# Patient Record
Sex: FEMALE | Race: WHITE | NOT HISPANIC OR LATINO | URBAN - METROPOLITAN AREA
[De-identification: names, ages, dates, MRNs, and addresses within clinical notes are randomized per-mention and may not be internally consistent; named-entity substitution may affect disease eponyms.]

---

## 2017-10-20 ENCOUNTER — APPOINTMENT (RX ONLY)
Dept: URBAN - METROPOLITAN AREA CLINIC 161 | Facility: CLINIC | Age: 72
Setting detail: DERMATOLOGY
End: 2017-10-20

## 2017-10-20 DIAGNOSIS — L21.8 OTHER SEBORRHEIC DERMATITIS: ICD-10-CM

## 2017-10-20 DIAGNOSIS — L20.89 OTHER ATOPIC DERMATITIS: ICD-10-CM

## 2017-10-20 DIAGNOSIS — L82.1 OTHER SEBORRHEIC KERATOSIS: ICD-10-CM

## 2017-10-20 DIAGNOSIS — L28.1 PRURIGO NODULARIS: ICD-10-CM

## 2017-10-20 DIAGNOSIS — D18.0 HEMANGIOMA: ICD-10-CM

## 2017-10-20 PROBLEM — E03.9 HYPOTHYROIDISM, UNSPECIFIED: Status: ACTIVE | Noted: 2017-10-20

## 2017-10-20 PROBLEM — L20.84 INTRINSIC (ALLERGIC) ECZEMA: Status: ACTIVE | Noted: 2017-10-20

## 2017-10-20 PROBLEM — D18.01 HEMANGIOMA OF SKIN AND SUBCUTANEOUS TISSUE: Status: ACTIVE | Noted: 2017-10-20

## 2017-10-20 PROCEDURE — 99213 OFFICE O/P EST LOW 20 MIN: CPT

## 2017-10-20 PROCEDURE — ? TREATMENT REGIMEN

## 2017-10-20 PROCEDURE — ? COUNSELING

## 2017-10-20 ASSESSMENT — LOCATION SIMPLE DESCRIPTION DERM
LOCATION SIMPLE: RIGHT SHOULDER
LOCATION SIMPLE: LEFT UPPER BACK
LOCATION SIMPLE: RIGHT THIGH
LOCATION SIMPLE: RIGHT PRETIBIAL REGION
LOCATION SIMPLE: RIGHT UPPER BACK
LOCATION SIMPLE: LEFT LOWER BACK
LOCATION SIMPLE: POSTERIOR SCALP

## 2017-10-20 ASSESSMENT — LOCATION DETAILED DESCRIPTION DERM
LOCATION DETAILED: LEFT MID-UPPER BACK
LOCATION DETAILED: LEFT INFERIOR MEDIAL MIDBACK
LOCATION DETAILED: MID-OCCIPITAL SCALP
LOCATION DETAILED: RIGHT ANTERIOR SHOULDER
LOCATION DETAILED: RIGHT INFERIOR UPPER BACK
LOCATION DETAILED: RIGHT PROXIMAL PRETIBIAL REGION
LOCATION DETAILED: LEFT SUPERIOR UPPER BACK
LOCATION DETAILED: RIGHT POSTERIOR SHOULDER
LOCATION DETAILED: RIGHT ANTERIOR PROXIMAL THIGH

## 2017-10-20 ASSESSMENT — LOCATION ZONE DERM
LOCATION ZONE: LEG
LOCATION ZONE: ARM
LOCATION ZONE: SCALP
LOCATION ZONE: TRUNK

## 2017-10-20 NOTE — PROCEDURE: TREATMENT REGIMEN
Detail Level: Zone
Initiate Treatment: Patient has clobetasol solution at home, okay to apply on scalp once to twice daily as needed for itching
Plan: Moisturize twice daily with otc Cetaphil lotion. When patient feels a new lesion start she has Triamcinolone cream and clobetasol liquid at home to use on new lesion

## 2017-10-20 NOTE — HPI: SKIN LESIONS
Have Your Skin Lesions Been Treated?: not been treated
Is This A New Presentation, Or A Follow-Up?: Skin Lesions
How Severe Is Your Skin Lesion?: moderate
Additional History: Patient is here for a full body skin check

## 2018-04-26 ENCOUNTER — IMPORTED ENCOUNTER (OUTPATIENT)
Dept: URBAN - METROPOLITAN AREA CLINIC 50 | Facility: CLINIC | Age: 73
End: 2018-04-26

## 2018-05-07 ENCOUNTER — IMPORTED ENCOUNTER (OUTPATIENT)
Dept: URBAN - METROPOLITAN AREA CLINIC 50 | Facility: CLINIC | Age: 73
End: 2018-05-07

## 2019-05-09 ENCOUNTER — IMPORTED ENCOUNTER (OUTPATIENT)
Dept: URBAN - METROPOLITAN AREA CLINIC 50 | Facility: CLINIC | Age: 74
End: 2019-05-09

## 2020-05-08 ENCOUNTER — IMPORTED ENCOUNTER (OUTPATIENT)
Dept: URBAN - METROPOLITAN AREA CLINIC 50 | Facility: CLINIC | Age: 75
End: 2020-05-08

## 2021-04-17 ASSESSMENT — TONOMETRY
OD_IOP_MMHG: 18
OS_IOP_MMHG: 15
OD_IOP_MMHG: 14
OS_IOP_MMHG: 17
OD_IOP_MMHG: 16
OS_IOP_MMHG: 18
OS_IOP_MMHG: 14
OD_IOP_MMHG: 16
OD_IOP_MMHG: 18
OS_IOP_MMHG: 18

## 2021-04-17 ASSESSMENT — VISUAL ACUITY
OS_SC: 20/25-1
OD_OTHER: 20/40. 20/50.
OD_SC: 20/40
OD_CC: J1+@ 14 IN
OD_BAT: 20/25-
OD_PH: 20/25-2
OD_CC: J1+@ 18 IN
OS_CC: J1+-2
OS_PH: 20/25-3
OS_CC: J1+@ 14 IN
OD_OTHER: 20/40. 20/60.
OD_SC: 20/40-1
OS_SC: 20/30
OD_SC: 20/40-
OS_SC: 20/30+2
OD_PH: 20/30-2
OD_CC: J1+-2
OD_BAT: 20/40
OD_BAT: 20/40
OS_CC: J1+@ 18 IN
OD_OTHER: 20/25-. 20/40-.
OD_PH: 20/30

## 2021-04-17 ASSESSMENT — PACHYMETRY
OS_CT_UM: 600
OD_CT_UM: 583
OD_CT_UM: 583
OS_CT_UM: 600
OD_CT_UM: 583
OS_CT_UM: 600

## 2021-05-13 ENCOUNTER — PREPPED CHART (OUTPATIENT)
Dept: URBAN - METROPOLITAN AREA CLINIC 52 | Facility: CLINIC | Age: 76
End: 2021-05-13

## 2021-05-14 ENCOUNTER — COMPREHENSIVE EXAM (OUTPATIENT)
Dept: URBAN - METROPOLITAN AREA CLINIC 52 | Facility: CLINIC | Age: 76
End: 2021-05-14

## 2021-05-14 DIAGNOSIS — H35.362: ICD-10-CM

## 2021-05-14 DIAGNOSIS — H25.11: ICD-10-CM

## 2021-05-14 DIAGNOSIS — H35.372: ICD-10-CM

## 2021-05-14 PROCEDURE — 92014 COMPRE OPH EXAM EST PT 1/>: CPT

## 2021-05-14 PROCEDURE — 92134 CPTRZ OPH DX IMG PST SGM RTA: CPT

## 2021-05-14 ASSESSMENT — VISUAL ACUITY
OD_CC: 20/25
OD_GLARE: 20/40
OS_CC: 20/50
OD_GLARE: 20/80
OU_CC: J1+
OS_SC: 20/30-2
OD_PH: 20/25
OD_SC: 20/40+2

## 2021-05-14 ASSESSMENT — TONOMETRY
OD_IOP_MMHG: 18
OD_IOP_MMHG: 16
OS_IOP_MMHG: 17
OS_IOP_MMHG: 20

## 2022-05-20 ENCOUNTER — COMPREHENSIVE EXAM (OUTPATIENT)
Dept: URBAN - METROPOLITAN AREA CLINIC 52 | Facility: CLINIC | Age: 77
End: 2022-05-20

## 2022-05-20 DIAGNOSIS — H35.362: ICD-10-CM

## 2022-05-20 DIAGNOSIS — H25.11: ICD-10-CM

## 2022-05-20 DIAGNOSIS — H35.372: ICD-10-CM

## 2022-05-20 PROCEDURE — 92014 COMPRE OPH EXAM EST PT 1/>: CPT

## 2022-05-20 PROCEDURE — 92134 CPTRZ OPH DX IMG PST SGM RTA: CPT

## 2022-05-20 ASSESSMENT — TONOMETRY
OD_IOP_MMHG: 19
OS_IOP_MMHG: 19
OS_IOP_MMHG: 16
OD_IOP_MMHG: 17

## 2022-05-20 ASSESSMENT — VISUAL ACUITY
OD_SC: 20/30+2
OU_CC: J1+
OS_SC: 20/30-2
OD_CC: J1
OS_CC: J1
OD_GLARE: 20/25
OD_PH: 20/25

## 2023-05-26 ENCOUNTER — COMPREHENSIVE EXAM (OUTPATIENT)
Dept: URBAN - METROPOLITAN AREA CLINIC 52 | Facility: CLINIC | Age: 78
End: 2023-05-26

## 2023-05-26 DIAGNOSIS — H35.362: ICD-10-CM

## 2023-05-26 DIAGNOSIS — H25.11: ICD-10-CM

## 2023-05-26 PROCEDURE — 92014 COMPRE OPH EXAM EST PT 1/>: CPT

## 2023-05-26 PROCEDURE — 92134 CPTRZ OPH DX IMG PST SGM RTA: CPT

## 2023-05-26 ASSESSMENT — VISUAL ACUITY
OD_GLARE: 20/20-1
OD_GLARE: 20/25
OU_CC: J1+@14"
OD_SC: 20/30
OS_PH: 20/25
OS_SC: 20/40

## 2023-05-26 ASSESSMENT — TONOMETRY
OD_IOP_MMHG: 21
OS_IOP_MMHG: 21
OS_IOP_MMHG: 18
OD_IOP_MMHG: 19

## 2024-05-31 ENCOUNTER — COMPREHENSIVE EXAM (OUTPATIENT)
Dept: URBAN - METROPOLITAN AREA CLINIC 52 | Facility: CLINIC | Age: 79
End: 2024-05-31

## 2024-05-31 DIAGNOSIS — H25.11: ICD-10-CM

## 2024-05-31 DIAGNOSIS — H02.831: ICD-10-CM

## 2024-05-31 DIAGNOSIS — H40.013: ICD-10-CM

## 2024-05-31 DIAGNOSIS — H52.4: ICD-10-CM

## 2024-05-31 DIAGNOSIS — H02.834: ICD-10-CM

## 2024-05-31 DIAGNOSIS — H35.372: ICD-10-CM

## 2024-05-31 PROCEDURE — 92134 CPTRZ OPH DX IMG PST SGM RTA: CPT

## 2024-05-31 PROCEDURE — 99214 OFFICE O/P EST MOD 30 MIN: CPT

## 2024-05-31 PROCEDURE — 92015 DETERMINE REFRACTIVE STATE: CPT

## 2024-05-31 ASSESSMENT — VISUAL ACUITY
OS_SC: 20/40
OS_PH: 20/30
OD_SC: 20/30
OD_CC: 20/30
OS_CC: 20/80
OD_PH: 20/25-1

## 2024-05-31 ASSESSMENT — TONOMETRY
OD_IOP_MMHG: 18
OS_IOP_MMHG: 17